# Patient Record
Sex: FEMALE | Race: ASIAN | NOT HISPANIC OR LATINO | URBAN - METROPOLITAN AREA
[De-identification: names, ages, dates, MRNs, and addresses within clinical notes are randomized per-mention and may not be internally consistent; named-entity substitution may affect disease eponyms.]

---

## 2017-04-11 ENCOUNTER — SURGERY - HEALTHEAST (OUTPATIENT)
Dept: SURGERY | Facility: HOSPITAL | Age: 47
End: 2017-04-11

## 2017-04-11 ENCOUNTER — ANESTHESIA - HEALTHEAST (OUTPATIENT)
Dept: SURGERY | Facility: HOSPITAL | Age: 47
End: 2017-04-11

## 2017-04-11 ASSESSMENT — MIFFLIN-ST. JEOR: SCORE: 1224.17

## 2017-04-12 ASSESSMENT — MIFFLIN-ST. JEOR: SCORE: 1215.09

## 2017-04-14 ENCOUNTER — COMMUNICATION - HEALTHEAST (OUTPATIENT)
Dept: INTERNAL MEDICINE | Facility: CLINIC | Age: 47
End: 2017-04-14

## 2017-04-21 ENCOUNTER — COMMUNICATION - HEALTHEAST (OUTPATIENT)
Dept: INTERNAL MEDICINE | Facility: CLINIC | Age: 47
End: 2017-04-21

## 2017-04-21 ENCOUNTER — OFFICE VISIT - HEALTHEAST (OUTPATIENT)
Dept: INTERNAL MEDICINE | Facility: CLINIC | Age: 47
End: 2017-04-21

## 2017-04-21 DIAGNOSIS — D64.9 ANEMIA: ICD-10-CM

## 2017-04-21 DIAGNOSIS — Z09 HOSPITAL DISCHARGE FOLLOW-UP: ICD-10-CM

## 2017-04-21 DIAGNOSIS — K37 APPENDICITIS: ICD-10-CM

## 2017-04-21 ASSESSMENT — MIFFLIN-ST. JEOR: SCORE: 1224.17

## 2021-05-30 VITALS — HEIGHT: 63 IN | BODY MASS INDEX: 24.45 KG/M2 | WEIGHT: 138 LBS

## 2021-05-30 VITALS — BODY MASS INDEX: 24.8 KG/M2 | HEIGHT: 63 IN | WEIGHT: 140 LBS

## 2021-06-10 NOTE — ANESTHESIA PREPROCEDURE EVALUATION
Anesthesia Evaluation      Patient summary reviewed     Airway   Mallampati: II   Pulmonary - negative ROS and normal exam                          Cardiovascular - negative ROS and normal exam   Neuro/Psych    (+) neuromuscular disease,      Endo/Other - negative ROS      GI/Hepatic/Renal - negative ROS      Other findings: Hb 7.9      Dental    (+) upper dentures and lower dentures                       Anesthesia Plan  Planned anesthetic: general endotracheal    ASA 2   Induction: intravenous   Anesthetic plan and risks discussed with: patient  Anesthesia plan special considerations: rapid sequence induction,   Post-op plan: routine recovery

## 2021-06-10 NOTE — ANESTHESIA POSTPROCEDURE EVALUATION
Patient: Jacqueline Kennedy  APPENDECTOMY, LAPAROSCOPIC  Anesthesia type: general    Patient location: PACU  Last vitals:   Vitals:    04/12/17 0240   BP: 124/87   Pulse: 64   Resp: 18   Temp: 37.1  C (98.7  F)   SpO2: 100%     Post vital signs: stable  Level of consciousness: awake and responds to simple questions  Post-anesthesia pain: pain controlled  Post-anesthesia nausea and vomiting: no  Pulmonary: unassisted, return to baseline  Cardiovascular: stable and blood pressure at baseline  Hydration: adequate  Anesthetic events: no    QCDR Measures:  ASA# 11 - Naye-op Cardiac Arrest: ASA11B - Patient did NOT experience unanticipated cardiac arrest  ASA# 12 - Naye-op Mortality Rate: ASA12B - Patient did NOT die  ASA# 13 - PACU Re-Intubation Rate: ASA13B - Patient did NOT require a new airway mgmt  ASA# 10 - Composite Anes Safety: ASA10A - No serious adverse event  ASA# 38 - New Corneal Injury: ASA38A - No new exposure keratitis or corneal abrasion in PACU    Additional Notes:

## 2021-06-10 NOTE — ANESTHESIA CARE TRANSFER NOTE
Last vitals:   Vitals:    04/12/17 0037   BP: (!) 135/95   Pulse: 78   Resp: 24   Temp: (!) 35.8  C (96.5  F)   SpO2: 100%     Patient's level of consciousness is drowsy  Spontaneous respirations: yes  Maintains airway independently: yes  Dentition unchanged: yes  Oropharynx: oropharynx clear of all foreign objects    QCDR Measures:  ASA# 20 - Surgical Safety Checklist: ASA20A - Safety Checks Done  PQRS# 430 - Adult PONV Prevention: 4558F - Pt received => 2 anti-emetic agents (different classes) preop & intraop  ASA# 8 - Peds PONV Prevention: NA - Not pediatric patient, not GA or 2 or more risk factors NOT present  PQRS# 424 - Naye-op Temp Management: 4559F - At least one body temp DOCUMENTED => 35.5C or 95.9F within required timeframe  PQRS# 426 - PACU Transfer Protocol: - Transfer of care checklist used  ASA# 14 - Acute Post-op Pain: ASA14B - Patient did NOT experience pain >= 7 out of 10    I completed my SBAR handoff to the receiving nurse per policy and procedure.

## 2021-06-15 PROBLEM — K37 APPENDICITIS: Status: ACTIVE | Noted: 2017-04-11

## 2021-06-15 PROBLEM — D50.9 MICROCYTIC ANEMIA: Status: ACTIVE | Noted: 2017-04-11

## 2021-06-15 PROBLEM — D64.9 ANEMIA: Status: ACTIVE | Noted: 2017-04-11

## 2021-06-25 NOTE — PROGRESS NOTES
Progress Notes by Liang Landers at 4/21/2017 11:40 AM     Author: Liang Landers Service: -- Author Type: Nurse Practitioner    Filed: 4/23/2017  8:35 PM Encounter Date: 4/21/2017 Status: Signed    : Liang Landers [facility]    Date of Service: 4/21/2017  Primary Provider: No Primary Care Provider    Patient Care Team:  No Primary Care Provider as PCP - General     ______________________________________________________________________     Patient's Pharmacy:    [id number] - [place], [place] - [address] AT [facility] [address]  [facility] [address]  [place] [address]  Phone: [phone] Fax: [phone]     Patient's Insurance:    Payor: Using[employer]iles / Plan: Using[employer]iles MNCARE / Product Type: PMAP /     ______________________________________________________________________    Assessment:    1. Hospital discharge follow-up    2. Appendicitis    3. Anemia       ______________________________________________________________________      Plan:  Patient Instructions   1. Check [facility]  2. [phone], echo Johnson (speaks English), call to give results of blood test      ______________________________________________________________________     Jacqueline Kennedy is 46 y.o. female who comes in today for:    Chief Complaint   Patient presents with   ? Hospital Visit Follow Up     [facility], 4/11-4/14, Appendicitis. Seems to be doing okay since leaving the hospital.        Patient Active Problem List   Diagnosis   ? Joint Pain, Localized In The Shoulder   ? Causalgia Of Right Upper Limb   ? Cervicalgia   ? Appendicitis   ? Microcytic anemia   ? Acute appendicitis with localized peritonitis   ? Anemia   ? S/P appendectomy   ? Nausea and vomiting, intractability of vomiting not specified, unspecified vomiting type   ? Abnormal urinalysis     Current Outpatient Prescriptions   Medication Sig   ? ferrous sulfate 325 (65 FE)  "MG tablet Take 1 tablet (325 mg total) by mouth 2 (two) times a day with meals.   ? HYDROcodone-acetaminophen 5-325 mg per tablet Take 1-2 tablets by mouth every 4 (four) hours as needed.     Social History     Social History   ? Marital status:      Spouse name: N/A   ? Number of children: N/A   ? Years of education: N/A     Occupational History   ? Not on file.     Social History Main Topics   ? Smoking status: Never Smoker   ? Smokeless tobacco: Not on file   ? Alcohol use No   ? Drug use: No   ? Sexual activity: Not on file     Other Topics Concern   ? Not on file     Social History Narrative    Lives with family     ______________________________________________________________________     History of present illness: Jacqueline Kennedy is a pleasant 46 y.o. female who presents in clinic today for follow up discharge [facility] (4/11/17-4/13/17) for Appendicitis - s/p appendectomy, microcytic anemia - iron deficiency as well as ? Thallasemia.  She denies having heavy menstrual cycles and feels that they have been normal.  Her LMP was at the end of March.  She denies any blood in her stools.  Has been having headaches lately with some associated \"fuzziness\" or dizziness feeling each day, lasts about 30 minutes, no particular time, some nausea, will sit down and rest or the symptoms will return and possibly cause her to fall.  She has some tenderness in right lower pelvis at incision site with ambulation, but is ok at rest.  She complains of dyspnea on exertion after walking up to 3 blocks, she will stop and rest and then will resume walking, then repeat, etc.     She inquires about Short Term Disability forms to be filled out, however, they appear to require a physician to complete these.    *Due to language barrier, an , Yohannes Ely with [employer],  was present during the history-taking and subsequent discussion (and for part of the physical exam) with this " "patient.      Review of systems:   On ROS, the patient denies fever, chills, night sweats, cough, chest pain, N/V, diarrhea, occasional LE edema and numbness but resolves with massage to reduce this.   Positive for symptoms as noted in the HPI.    ______________________________________________________________________    Wt Readings from Last 3 Encounters:   04/21/17 140 lb (63.5 kg)   04/12/17 138 lb (62.6 kg)     BP Readings from Last 3 Encounters:   04/21/17 122/84   04/13/17 112/63     /84  Pulse 74  Ht 5' 3\" (1.6 m)  Wt 140 lb (63.5 kg)  BMI 24.8 kg/m2     Physical Exam:  General Appearance: Alert, cooperative, no distress, appears stated age  Head: Normocephalic, without obvious abnormality, atraumatic  Eyes: PERRL, conjunctiva/corneas clear  Ears: Normal TM's and external ear canals, both ears  Nose: Nares normal, septum midline,mucosa normal, no drainage  Throat: Lips, mucosa, and tongue normal; teeth and gums normal  Neck: Supple, symmetrical, trachea midline, no adenopathy; thyroid: not enlarged, symmetric, no tenderness/mass/nodules  Back: Symmetric, no curvature, ROM normal, no CVA tenderness  Lungs: Clear to auscultation bilaterally, respirations unlabored  Heart: Regular rate and rhythm, S1 and S2 normal, no murmur, rub, or gallop  Abdomen: Soft, slight tenderness RLQ, bowel sounds active all four quadrants, no masses, no organomegaly  Musculoskeletal: Normal range of motion. No joint swelling or deformity.   Extremities: Extremities normal, atraumatic, no cyanosis or edema  Skin: Skin color, texture, turgor normal, no rashes or lesions  Lymph nodes: Cervical and supraclavicular nodes normal  Neurologic: She is alert & oriented x 3.  Psychiatric: She has a normal mood and affect.     Diagnostics:  Results for orders placed or performed in visit on 04/21/17   HM1 (CBC with Diff)   Result Value Ref Range    WBC 7.7 4.0 - 11.0 thou/uL    RBC 4.98 3.80 - 5.40 mill/uL    Hemoglobin 8.2 (L) 12.0 - " 16.0 g/dL    Hematocrit 30.4 (L) 35.0 - 47.0 %    MCV 61 (L) 80 - 100 fL    MCH 16.5 (L) 27.0 - 34.0 pg    MCHC 27.0 (L) 32.0 - 36.0 g/dL    RDW 22.7 (H) 11.0 - 14.5 %    Platelets 419 140 - 440 thou/uL    MPV  8.5 - 12.5 fL   Manual Differential   Result Value Ref Range    Total Neutrophils % 42 (L) 50 - 70 %    Lymphocytes % 21 20 - 40 %    Monocytes % 10 2 - 10 %    Eosinophils %  27 (H) 0 - 6 %    Basophils % 1 0 - 2 %    Total Neutrophils Absolute 3.2 2.0 - 7.7 thou/ul    Lymphocytes Absolute 1.6 0.8 - 4.4 thou/uL    Monocytes Absolute 0.7 0.0 - 0.9 thou/uL    Eosinophils Absolute 2.0 (H) 0.0 - 0.4 thou/uL    Basophils Absolute 0.1 0.0 - 0.2 thou/uL    Platelet Estimate Normal Normal    Ovalocytes 1+ (!) Negative    Polychromasia 1+ (!) Negative    Schistocytes 1+ (!) Negative       Liang Landers, [facility]  Internal Medicine  [facility]     Return in about 1 week (around 4/28/2017), or if symptoms worsen or fail to improve.